# Patient Record
Sex: FEMALE | Race: WHITE | NOT HISPANIC OR LATINO
[De-identification: names, ages, dates, MRNs, and addresses within clinical notes are randomized per-mention and may not be internally consistent; named-entity substitution may affect disease eponyms.]

---

## 2017-09-12 ENCOUNTER — RESULT REVIEW (OUTPATIENT)
Age: 47
End: 2017-09-12

## 2018-03-13 ENCOUNTER — TRANSCRIPTION ENCOUNTER (OUTPATIENT)
Age: 48
End: 2018-03-13

## 2018-04-24 ENCOUNTER — EMERGENCY (EMERGENCY)
Facility: HOSPITAL | Age: 48
LOS: 1 days | Discharge: HOME | End: 2018-04-24
Admitting: EMERGENCY MEDICINE
Payer: COMMERCIAL

## 2018-04-24 VITALS
DIASTOLIC BLOOD PRESSURE: 79 MMHG | TEMPERATURE: 98 F | OXYGEN SATURATION: 99 % | RESPIRATION RATE: 18 BRPM | HEART RATE: 78 BPM | SYSTOLIC BLOOD PRESSURE: 131 MMHG

## 2018-04-24 VITALS — SYSTOLIC BLOOD PRESSURE: 108 MMHG | DIASTOLIC BLOOD PRESSURE: 80 MMHG

## 2018-04-24 PROBLEM — Z00.00 ENCOUNTER FOR PREVENTIVE HEALTH EXAMINATION: Status: ACTIVE | Noted: 2018-04-24

## 2018-04-24 LAB
ALBUMIN SERPL ELPH-MCNC: 4.3 G/DL — SIGNIFICANT CHANGE UP (ref 3.3–5)
ALP SERPL-CCNC: 59 U/L — SIGNIFICANT CHANGE UP (ref 40–120)
ALT FLD-CCNC: 18 U/L — SIGNIFICANT CHANGE UP (ref 4–33)
AST SERPL-CCNC: 17 U/L — SIGNIFICANT CHANGE UP (ref 4–32)
BASOPHILS # BLD AUTO: 0.02 K/UL — SIGNIFICANT CHANGE UP (ref 0–0.2)
BASOPHILS NFR BLD AUTO: 0.2 % — SIGNIFICANT CHANGE UP (ref 0–2)
BILIRUB SERPL-MCNC: 0.4 MG/DL — SIGNIFICANT CHANGE UP (ref 0.2–1.2)
BUN SERPL-MCNC: 12 MG/DL — SIGNIFICANT CHANGE UP (ref 7–23)
CALCIUM SERPL-MCNC: 9.4 MG/DL — SIGNIFICANT CHANGE UP (ref 8.4–10.5)
CHLORIDE SERPL-SCNC: 100 MMOL/L — SIGNIFICANT CHANGE UP (ref 98–107)
CO2 SERPL-SCNC: 28 MMOL/L — SIGNIFICANT CHANGE UP (ref 22–31)
CREAT SERPL-MCNC: 0.67 MG/DL — SIGNIFICANT CHANGE UP (ref 0.5–1.3)
EOSINOPHIL # BLD AUTO: 0.01 K/UL — SIGNIFICANT CHANGE UP (ref 0–0.5)
EOSINOPHIL NFR BLD AUTO: 0.1 % — SIGNIFICANT CHANGE UP (ref 0–6)
GLUCOSE SERPL-MCNC: 104 MG/DL — HIGH (ref 70–99)
HCT VFR BLD CALC: 40.5 % — SIGNIFICANT CHANGE UP (ref 34.5–45)
HGB BLD-MCNC: 13.4 G/DL — SIGNIFICANT CHANGE UP (ref 11.5–15.5)
IMM GRANULOCYTES # BLD AUTO: 0.03 # — SIGNIFICANT CHANGE UP
IMM GRANULOCYTES NFR BLD AUTO: 0.3 % — SIGNIFICANT CHANGE UP (ref 0–1.5)
LYMPHOCYTES # BLD AUTO: 0.78 K/UL — LOW (ref 1–3.3)
LYMPHOCYTES # BLD AUTO: 7.2 % — LOW (ref 13–44)
MCHC RBC-ENTMCNC: 29.6 PG — SIGNIFICANT CHANGE UP (ref 27–34)
MCHC RBC-ENTMCNC: 33.1 % — SIGNIFICANT CHANGE UP (ref 32–36)
MCV RBC AUTO: 89.4 FL — SIGNIFICANT CHANGE UP (ref 80–100)
MONOCYTES # BLD AUTO: 0.33 K/UL — SIGNIFICANT CHANGE UP (ref 0–0.9)
MONOCYTES NFR BLD AUTO: 3.1 % — SIGNIFICANT CHANGE UP (ref 2–14)
NEUTROPHILS # BLD AUTO: 9.61 K/UL — HIGH (ref 1.8–7.4)
NEUTROPHILS NFR BLD AUTO: 89.1 % — HIGH (ref 43–77)
NRBC # FLD: 0 — SIGNIFICANT CHANGE UP
PLATELET # BLD AUTO: 264 K/UL — SIGNIFICANT CHANGE UP (ref 150–400)
PMV BLD: 9.2 FL — SIGNIFICANT CHANGE UP (ref 7–13)
POTASSIUM SERPL-MCNC: 4.1 MMOL/L — SIGNIFICANT CHANGE UP (ref 3.5–5.3)
POTASSIUM SERPL-SCNC: 4.1 MMOL/L — SIGNIFICANT CHANGE UP (ref 3.5–5.3)
PROT SERPL-MCNC: 7.3 G/DL — SIGNIFICANT CHANGE UP (ref 6–8.3)
RBC # BLD: 4.53 M/UL — SIGNIFICANT CHANGE UP (ref 3.8–5.2)
RBC # FLD: 13.3 % — SIGNIFICANT CHANGE UP (ref 10.3–14.5)
SODIUM SERPL-SCNC: 140 MMOL/L — SIGNIFICANT CHANGE UP (ref 135–145)
WBC # BLD: 10.78 K/UL — HIGH (ref 3.8–10.5)
WBC # FLD AUTO: 10.78 K/UL — HIGH (ref 3.8–10.5)

## 2018-04-24 PROCEDURE — 99284 EMERGENCY DEPT VISIT MOD MDM: CPT

## 2018-04-24 RX ORDER — SODIUM CHLORIDE 9 MG/ML
1000 INJECTION INTRAMUSCULAR; INTRAVENOUS; SUBCUTANEOUS ONCE
Qty: 0 | Refills: 0 | Status: COMPLETED | OUTPATIENT
Start: 2018-04-24 | End: 2018-04-24

## 2018-04-24 RX ORDER — METOCLOPRAMIDE HCL 10 MG
10 TABLET ORAL ONCE
Qty: 0 | Refills: 0 | Status: COMPLETED | OUTPATIENT
Start: 2018-04-24 | End: 2018-04-24

## 2018-04-24 RX ORDER — ONDANSETRON 8 MG/1
4 TABLET, FILM COATED ORAL ONCE
Qty: 0 | Refills: 0 | Status: COMPLETED | OUTPATIENT
Start: 2018-04-24 | End: 2018-04-24

## 2018-04-24 RX ORDER — KETOROLAC TROMETHAMINE 30 MG/ML
15 SYRINGE (ML) INJECTION ONCE
Qty: 0 | Refills: 0 | Status: DISCONTINUED | OUTPATIENT
Start: 2018-04-24 | End: 2018-04-24

## 2018-04-24 RX ADMIN — Medication 10 MILLIGRAM(S): at 15:26

## 2018-04-24 RX ADMIN — ONDANSETRON 4 MILLIGRAM(S): 8 TABLET, FILM COATED ORAL at 15:27

## 2018-04-24 RX ADMIN — SODIUM CHLORIDE 1000 MILLILITER(S): 9 INJECTION INTRAMUSCULAR; INTRAVENOUS; SUBCUTANEOUS at 15:21

## 2018-04-24 RX ADMIN — Medication 15 MILLIGRAM(S): at 15:26

## 2018-04-24 NOTE — ED ADULT NURSE NOTE - OBJECTIVE STATEMENT
Pt received to intake 10.  seen and eval'd by md.  sl placed.  labs sent.  meds given as ordered.  resting in nad.

## 2018-04-24 NOTE — ED PROVIDER NOTE - OBJECTIVE STATEMENT
47 yo F hx of migraines (typically takes Excedrin) here for headache x 2 days. pt reports over the past 2 days she has had a frontal headache, pressure, associated with nausea and dry heaving. Feels similar to all previous migraines. 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 Denies head trauma. Denies fever chills neck pain/stiffness abdominal pain diarrhea cp sob weakness dizziness

## 2018-04-24 NOTE — ED PROVIDER NOTE - MEDICAL DECISION MAKING DETAILS
49 yo F here for migraine, similar to previous migraines. Otherwise well. neuro intact, consistent with previous migraines, no new sxs. PLAN: fluids, meds, labs,

## 2018-04-24 NOTE — ED PROVIDER NOTE - PROGRESS NOTE DETAILS
JONATHAN Burger: pt doing well, no complaints, HA resolved, labs reviewed. Will dc with otc meds and pmd follow up.

## 2018-04-24 NOTE — ED PROVIDER NOTE - CARE PLAN
Principal Discharge DX:	Migraine  Assessment and plan of treatment:	Rest, drink plenty of fluids.  Advance activity as tolerated.  Continue all previously prescribed medications as directed. You can use motrin 600mg every 6-8 hours for pain or fever, and/or Tylenol 650 mg every 4 hours for pain/fever. Follow up with your primary care physician in 48-72 hours- bring copies of your results.  Return to the emergency department for chest pain, shortness of breath, dizziness, or worsening, concerning, or persistent symptoms.

## 2021-09-20 ENCOUNTER — INPATIENT (INPATIENT)
Facility: HOSPITAL | Age: 51
LOS: 0 days | Discharge: LEFT BEFORE DISCHARGE | End: 2021-09-21
Attending: INTERNAL MEDICINE | Admitting: INTERNAL MEDICINE
Payer: COMMERCIAL

## 2021-09-20 VITALS
HEIGHT: 66 IN | RESPIRATION RATE: 16 BRPM | OXYGEN SATURATION: 100 % | HEART RATE: 123 BPM | TEMPERATURE: 98 F | DIASTOLIC BLOOD PRESSURE: 110 MMHG | SYSTOLIC BLOOD PRESSURE: 149 MMHG

## 2021-09-20 DIAGNOSIS — G93.40 ENCEPHALOPATHY, UNSPECIFIED: ICD-10-CM

## 2021-09-20 DIAGNOSIS — R41.82 ALTERED MENTAL STATUS, UNSPECIFIED: ICD-10-CM

## 2021-09-20 DIAGNOSIS — Z29.9 ENCOUNTER FOR PROPHYLACTIC MEASURES, UNSPECIFIED: ICD-10-CM

## 2021-09-20 DIAGNOSIS — G43.909 MIGRAINE, UNSPECIFIED, NOT INTRACTABLE, WITHOUT STATUS MIGRAINOSUS: ICD-10-CM

## 2021-09-20 LAB
ALBUMIN SERPL ELPH-MCNC: 4.3 G/DL — SIGNIFICANT CHANGE UP (ref 3.3–5)
ALP SERPL-CCNC: 55 U/L — SIGNIFICANT CHANGE UP (ref 40–120)
ALT FLD-CCNC: 11 U/L — SIGNIFICANT CHANGE UP (ref 4–33)
AMMONIA BLD-MCNC: 25 UMOL/L — SIGNIFICANT CHANGE UP (ref 11–55)
ANION GAP SERPL CALC-SCNC: 9 MMOL/L — SIGNIFICANT CHANGE UP (ref 7–14)
APPEARANCE UR: ABNORMAL
AST SERPL-CCNC: 14 U/L — SIGNIFICANT CHANGE UP (ref 4–32)
B PERT DNA SPEC QL NAA+PROBE: SIGNIFICANT CHANGE UP
B PERT+PARAPERT DNA PNL SPEC NAA+PROBE: SIGNIFICANT CHANGE UP
BACTERIA # UR AUTO: ABNORMAL
BASOPHILS # BLD AUTO: 0.02 K/UL — SIGNIFICANT CHANGE UP (ref 0–0.2)
BASOPHILS NFR BLD AUTO: 0.2 % — SIGNIFICANT CHANGE UP (ref 0–2)
BILIRUB SERPL-MCNC: 0.4 MG/DL — SIGNIFICANT CHANGE UP (ref 0.2–1.2)
BILIRUB UR-MCNC: NEGATIVE — SIGNIFICANT CHANGE UP
BLOOD GAS VENOUS COMPREHENSIVE RESULT: SIGNIFICANT CHANGE UP
BORDETELLA PARAPERTUSSIS (RAPRVP): SIGNIFICANT CHANGE UP
BUN SERPL-MCNC: 8 MG/DL — SIGNIFICANT CHANGE UP (ref 7–23)
C PNEUM DNA SPEC QL NAA+PROBE: SIGNIFICANT CHANGE UP
CALCIUM SERPL-MCNC: 9.5 MG/DL — SIGNIFICANT CHANGE UP (ref 8.4–10.5)
CHLORIDE SERPL-SCNC: 104 MMOL/L — SIGNIFICANT CHANGE UP (ref 98–107)
CK SERPL-CCNC: 169 U/L — SIGNIFICANT CHANGE UP (ref 25–170)
CK SERPL-CCNC: 196 U/L — HIGH (ref 25–170)
CO2 SERPL-SCNC: 25 MMOL/L — SIGNIFICANT CHANGE UP (ref 22–31)
COLOR SPEC: YELLOW — SIGNIFICANT CHANGE UP
COMMENT - URINE: SIGNIFICANT CHANGE UP
CREAT SERPL-MCNC: 0.76 MG/DL — SIGNIFICANT CHANGE UP (ref 0.5–1.3)
DIFF PNL FLD: ABNORMAL
EOSINOPHIL # BLD AUTO: 0.01 K/UL — SIGNIFICANT CHANGE UP (ref 0–0.5)
EOSINOPHIL NFR BLD AUTO: 0.1 % — SIGNIFICANT CHANGE UP (ref 0–6)
EPI CELLS # UR: 15 /HPF — HIGH (ref 0–5)
FLUAV SUBTYP SPEC NAA+PROBE: SIGNIFICANT CHANGE UP
FLUBV RNA SPEC QL NAA+PROBE: SIGNIFICANT CHANGE UP
FOLATE SERPL-MCNC: 13.8 NG/ML — SIGNIFICANT CHANGE UP (ref 3.1–17.5)
GLUCOSE BLDC GLUCOMTR-MCNC: 93 MG/DL — SIGNIFICANT CHANGE UP (ref 70–99)
GLUCOSE SERPL-MCNC: 126 MG/DL — HIGH (ref 70–99)
GLUCOSE UR QL: NEGATIVE — SIGNIFICANT CHANGE UP
HADV DNA SPEC QL NAA+PROBE: SIGNIFICANT CHANGE UP
HCG SERPL-ACNC: <5 MIU/ML — SIGNIFICANT CHANGE UP
HCOV 229E RNA SPEC QL NAA+PROBE: SIGNIFICANT CHANGE UP
HCOV HKU1 RNA SPEC QL NAA+PROBE: SIGNIFICANT CHANGE UP
HCOV NL63 RNA SPEC QL NAA+PROBE: SIGNIFICANT CHANGE UP
HCOV OC43 RNA SPEC QL NAA+PROBE: SIGNIFICANT CHANGE UP
HCT VFR BLD CALC: 40.8 % — SIGNIFICANT CHANGE UP (ref 34.5–45)
HGB BLD-MCNC: 13.6 G/DL — SIGNIFICANT CHANGE UP (ref 11.5–15.5)
HMPV RNA SPEC QL NAA+PROBE: SIGNIFICANT CHANGE UP
HPIV1 RNA SPEC QL NAA+PROBE: SIGNIFICANT CHANGE UP
HPIV2 RNA SPEC QL NAA+PROBE: SIGNIFICANT CHANGE UP
HPIV3 RNA SPEC QL NAA+PROBE: SIGNIFICANT CHANGE UP
HPIV4 RNA SPEC QL NAA+PROBE: SIGNIFICANT CHANGE UP
HYALINE CASTS # UR AUTO: 3 /LPF — SIGNIFICANT CHANGE UP (ref 0–7)
IANC: 7.16 K/UL — SIGNIFICANT CHANGE UP (ref 1.5–8.5)
IMM GRANULOCYTES NFR BLD AUTO: 0.3 % — SIGNIFICANT CHANGE UP (ref 0–1.5)
KETONES UR-MCNC: ABNORMAL
LACTATE SERPL-SCNC: 0.7 MMOL/L — SIGNIFICANT CHANGE UP (ref 0.5–2)
LEUKOCYTE ESTERASE UR-ACNC: NEGATIVE — SIGNIFICANT CHANGE UP
LYMPHOCYTES # BLD AUTO: 0.92 K/UL — LOW (ref 1–3.3)
LYMPHOCYTES # BLD AUTO: 10.7 % — LOW (ref 13–44)
M PNEUMO DNA SPEC QL NAA+PROBE: SIGNIFICANT CHANGE UP
MCHC RBC-ENTMCNC: 28.9 PG — SIGNIFICANT CHANGE UP (ref 27–34)
MCHC RBC-ENTMCNC: 33.3 GM/DL — SIGNIFICANT CHANGE UP (ref 32–36)
MCV RBC AUTO: 86.8 FL — SIGNIFICANT CHANGE UP (ref 80–100)
MONOCYTES # BLD AUTO: 0.49 K/UL — SIGNIFICANT CHANGE UP (ref 0–0.9)
MONOCYTES NFR BLD AUTO: 5.7 % — SIGNIFICANT CHANGE UP (ref 2–14)
NEUTROPHILS # BLD AUTO: 7.16 K/UL — SIGNIFICANT CHANGE UP (ref 1.8–7.4)
NEUTROPHILS NFR BLD AUTO: 83 % — HIGH (ref 43–77)
NITRITE UR-MCNC: NEGATIVE — SIGNIFICANT CHANGE UP
NRBC # BLD: 0 /100 WBCS — SIGNIFICANT CHANGE UP
NRBC # FLD: 0 K/UL — SIGNIFICANT CHANGE UP
PCP SPEC-MCNC: SIGNIFICANT CHANGE UP
PH UR: 5.5 — SIGNIFICANT CHANGE UP (ref 5–8)
PLATELET # BLD AUTO: 362 K/UL — SIGNIFICANT CHANGE UP (ref 150–400)
POTASSIUM SERPL-MCNC: 3.8 MMOL/L — SIGNIFICANT CHANGE UP (ref 3.5–5.3)
POTASSIUM SERPL-SCNC: 3.8 MMOL/L — SIGNIFICANT CHANGE UP (ref 3.5–5.3)
PROT SERPL-MCNC: 7.1 G/DL — SIGNIFICANT CHANGE UP (ref 6–8.3)
PROT UR-MCNC: ABNORMAL
RAPID RVP RESULT: SIGNIFICANT CHANGE UP
RBC # BLD: 4.7 M/UL — SIGNIFICANT CHANGE UP (ref 3.8–5.2)
RBC # FLD: 13.7 % — SIGNIFICANT CHANGE UP (ref 10.3–14.5)
RBC CASTS # UR COMP ASSIST: 3 /HPF — SIGNIFICANT CHANGE UP (ref 0–4)
RSV RNA SPEC QL NAA+PROBE: SIGNIFICANT CHANGE UP
RV+EV RNA SPEC QL NAA+PROBE: SIGNIFICANT CHANGE UP
SARS-COV-2 RNA SPEC QL NAA+PROBE: SIGNIFICANT CHANGE UP
SODIUM SERPL-SCNC: 138 MMOL/L — SIGNIFICANT CHANGE UP (ref 135–145)
SP GR SPEC: 1.03 — SIGNIFICANT CHANGE UP (ref 1–1.05)
TOXICOLOGY SCREEN, DRUGS OF ABUSE, SERUM RESULT: SIGNIFICANT CHANGE UP
TSH SERPL-MCNC: 2.07 UIU/ML — SIGNIFICANT CHANGE UP (ref 0.27–4.2)
UROBILINOGEN FLD QL: SIGNIFICANT CHANGE UP
VIT B12 SERPL-MCNC: 581 PG/ML — SIGNIFICANT CHANGE UP (ref 200–900)
WBC # BLD: 8.63 K/UL — SIGNIFICANT CHANGE UP (ref 3.8–10.5)
WBC # FLD AUTO: 8.63 K/UL — SIGNIFICANT CHANGE UP (ref 3.8–10.5)
WBC UR QL: 2 /HPF — SIGNIFICANT CHANGE UP (ref 0–5)

## 2021-09-20 PROCEDURE — 99285 EMERGENCY DEPT VISIT HI MDM: CPT

## 2021-09-20 PROCEDURE — 99223 1ST HOSP IP/OBS HIGH 75: CPT

## 2021-09-20 PROCEDURE — 70498 CT ANGIOGRAPHY NECK: CPT | Mod: 26

## 2021-09-20 PROCEDURE — 70496 CT ANGIOGRAPHY HEAD: CPT | Mod: 26

## 2021-09-20 RX ORDER — LANOLIN ALCOHOL/MO/W.PET/CERES
3 CREAM (GRAM) TOPICAL AT BEDTIME
Refills: 0 | Status: DISCONTINUED | OUTPATIENT
Start: 2021-09-20 | End: 2021-09-21

## 2021-09-20 RX ORDER — ONDANSETRON 8 MG/1
4 TABLET, FILM COATED ORAL EVERY 8 HOURS
Refills: 0 | Status: DISCONTINUED | OUTPATIENT
Start: 2021-09-20 | End: 2021-09-21

## 2021-09-20 RX ORDER — SODIUM CHLORIDE 9 MG/ML
1000 INJECTION, SOLUTION INTRAVENOUS ONCE
Refills: 0 | Status: COMPLETED | OUTPATIENT
Start: 2021-09-20 | End: 2021-09-20

## 2021-09-20 RX ORDER — ACETAMINOPHEN 500 MG
650 TABLET ORAL EVERY 6 HOURS
Refills: 0 | Status: DISCONTINUED | OUTPATIENT
Start: 2021-09-20 | End: 2021-09-21

## 2021-09-20 RX ADMIN — SODIUM CHLORIDE 1000 MILLILITER(S): 9 INJECTION, SOLUTION INTRAVENOUS at 07:56

## 2021-09-20 RX ADMIN — Medication 650 MILLIGRAM(S): at 20:27

## 2021-09-20 RX ADMIN — Medication 650 MILLIGRAM(S): at 20:42

## 2021-09-20 NOTE — H&P ADULT - PROBLEM SELECTOR PLAN 1
Etiology is not clear at this time, r/o seizure, TIA, encephaliti, r/o psychiatric etiology  -Neuro consult  -Consider EEG and MRI  -Fall precaution   -CO for safety

## 2021-09-20 NOTE — ED ADULT NURSE NOTE - CHIEF COMPLAINT QUOTE
As per friend, on Saturday pt was "Acting different and appeared confused." Friend states she has been having "shakes" since then but yesterday she had an episode where her eyes rolled back and she was drooling. Pt states she feels different but cannot explain. Denies chest pain, sob, HA, dizziness, vision changes, numbness/tingling, PMHx. Pt aaox2 at present.    Friend Martha Zaira: [phone]

## 2021-09-20 NOTE — H&P ADULT - NSHPPHYSICALEXAM_GEN_ALL_CORE
Vital Signs Last 24 Hrs  T(C): 36.9 (20 Sep 2021 11:50), Max: 36.9 (20 Sep 2021 11:50)  T(F): 98.5 (20 Sep 2021 11:50), Max: 98.5 (20 Sep 2021 11:50)  HR: 88 (20 Sep 2021 11:50) (88 - 123)  BP: 143/92 (20 Sep 2021 11:50) (143/92 - 149/110)  BP(mean): --  RR: 17 (20 Sep 2021 11:50) (16 - 17)  SpO2: 98% (20 Sep 2021 11:50) (98% - 100%)

## 2021-09-20 NOTE — CONSULT NOTE ADULT - ASSESSMENT
51 y.o. female with a history of migraine headaches, prior admission 06/13/2011 with c/o fever/HA/acute change in mental status with negative general CSF studies and resolution of symptoms in one day, presenting with acute change in mental status and confusion since Sunday 9/19 at 0400. Patient reported to wake up at that time to go to the bathroom and noted to have difficulty expressing herself, oriented to person only and responding to questions by only stating her name. Vitals on presentation: 149/110, 123, 16, 98.2, 100% RA. On exam patient is oriented to self, some perseveration noted when asked location and date ("168-08"), speech fluent with word finding difficulty/hesitancy noted, hyperreflexia. Labs reviewed and wnl, utox negative, UA not suggestive of infection. Imaging with CTH/CTA head and neck unremarkable.     Impression:       Recommendations:   Labs:  []  [] Infectious workup  [] NH3, lactate, CK  [] Folate, B12  [] TSH    Imaging/Studies  [] Consider MRI brain   [] Consider Routine EEG  [] Consider LP if symptoms persist or worsen    Other:  [] Telemonitoring  [] Neurochecks and vital signs per protocol      ***INCOMPLETE*** 51 y.o. female with a history of migraine headaches, prior admission 06/13/2011 with c/o fever/HA/acute change in mental status with negative general CSF studies and resolution of symptoms in one day, presenting with acute change in mental status and confusion since Sunday 9/19***  Vitals on presentation: 149/110, 123, 16, 98.2, 100% RA. On exam patient is oriented to self, some perseveration noted when asked location and date ("168-08"), speech fluent with word finding difficulty/hesitancy noted, hyperreflexia. Labs reviewed and wnl, utox negative, UA not suggestive of infection. Imaging with CTH/CTA head and neck unremarkable.     Impression:       Recommendations:   Labs:  []  [] Infectious workup  [] NH3, lactate, CK  [] Folate, B12  [] TSH    Imaging/Studies  [] Routine EEG  [] Consider MRI brain   [] Consider LP if symptoms persist or worsen    Other:  [] Telemonitoring  [] Neurochecks and vital signs per protocol  [] Seizure precautions; Fall precautions      ***INCOMPLETE*** 51 y.o. female with a history of migraine headaches, prior admission 06/13/2011 with c/o fever/HA/acute change in mental status with negative general CSF studies and resolution of symptoms in one day, episode of shaking and change in mentation either late last year or earlier this year (12/2020 -01/2021) after which she was reportedly started on Hydroxyzine by a GP, presenting with acute change in mental status and confusion since Sunday 9/19, at which time patient is reported to have had an episode of whole body shaking with foaming at the mouth and eyes rolled back, lasting less than a minute after which the patient fell asleep. Patient subsequently reported to have a change in mentation, as per friend she initially had no difficulty speaking, but appeared to have difficulty with comprehension. Vitals on presentation: 149/110, 123, 16, 98.2, 100% RA. On exam patient is oriented to self, some perseveration noted when asked location and date ("168-08"), speech fluent with word finding difficulty/hesitancy noted, hyperreflexia. Labs reviewed and wnl, utox negative, UA not suggestive of infection. Imaging with CTH/CTA head and neck unremarkable.     Impression: Acute change in mental status, word finding difficulty/hesitancy possibly due to toxic/metabolic etiology vs seizure vs due to infectious etiology vs possible underlying autoimmune/inflammatory pathology, thought low suspicion at this time.      Recommendations:   Labs:  [] Infectious workup  [] NH3, lactate, CK  [] Folate, B12  [] TSH    Imaging/Studies  [] Routine EEG  [] If symptoms persist or worsen, can consider MRI brain and LP    Other:  [] Telemonitoring  [] Neurochecks and vital signs per protocol  [] Seizure precautions; Fall precautions      Case to be seen and discussed with Neurology attending Dr. Miramontes. Recommendations to be finalized upon attestation. 51 y.o. female with a history of migraine headaches, prior admission 06/13/2011 with c/o fever/HA/acute change in mental status with negative general CSF studies and resolution of symptoms in one day, episode of shaking and change in mentation either late last year or earlier this year (12/2020 -01/2021) after which she was reportedly started on Hydroxyzine by a GP, presenting with acute change in mental status and confusion since Sunday 9/19, at which time patient is reported to have had an episode of whole body shaking with foaming at the mouth and eyes rolled back, lasting less than a minute after which the patient fell asleep. Patient subsequently reported to have a change in mentation, as per friend she initially had no difficulty speaking, but appeared to have difficulty with comprehension. Vitals on presentation: 149/110, 123, 16, 98.2, 100% RA. On exam patient is oriented to self, some perseveration noted when asked location and date ("168-08"), speech fluent with word finding difficulty/hesitancy noted, hyperreflexia. Labs reviewed and wnl, utox negative, UA not suggestive of infection. Imaging with CTH/CTA head and neck unremarkable.     Impression: Acute change in mental status, word finding difficulty/hesitancy possibly due to toxic/metabolic etiology vs seizure vs due to infectious etiology vs possible underlying autoimmune/inflammatory pathology, thought low suspicion at this time.      Recommendations:   Labs:  [] Infectious workup  [] NH3, lactate, CK  [] Folate, B12  [] TSH    Imaging/Studies  [] Routine EEG and vEEG  [] MRI brain w/wo contrast  [] If symptoms persist or worsen, can consider LP    Other:  [] Telemonitoring  [] Neurochecks and vital signs per protocol  [] Seizure precautions; Fall precautions      Case discussed with Neurology attending Dr. Miramontes. Recommendations to be finalized upon attestation.

## 2021-09-20 NOTE — H&P ADULT - NEUROLOGICAL DETAILS
responds to pain/responds to verbal commands/sensation intact/deep reflexes intact/cranial nerves intact/normal strength/disoriented

## 2021-09-20 NOTE — ED PROVIDER NOTE - NS ED ROS FT
3 = unable to understand or speak (not related to language barrier) Pt denies complaints but is A+O X 1

## 2021-09-20 NOTE — H&P ADULT - NSHPSOCIALHISTORY_GEN_ALL_CORE
Pt denies any alcohol, illicit drug or tobacco abuse  She works as a  at [employer]   She stated that she is  to Martha.

## 2021-09-20 NOTE — CONSULT NOTE ADULT - ATTENDING COMMENTS
52 y/o woman no significant PMH p/w episode of AMS, shaking following by unresponsiveness. Pt has hx of two prior episodes that were similar back in December and March of this year. Pt is feeling much better today. 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 Pt's mood is good, normal affect, but very talkative, and saying that she recognizes multiple people in the room. She is fully oriented, answering questions appropriately, Follows commands, language normal, strength 5/5 throughout.  Possible seizure event with full return to baseline.   - routine EEG  - Can get rest of workup as outpatient. She can f/u with me [phone] [address] [facility] for MRI and continuous EEG  - Would get Psych for anxiety and odd affect, possible otilia. 52 y/o woman hx of migraines p/w episode of AMS, shaking following by unresponsiveness. Pt has hx of two prior episodes that were similar back in December and March of this year. Pt is feeling much better today. 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 Pt's mood is good, normal affect, but very talkative, and saying that she recognizes multiple people in the room. She is fully oriented, answering questions appropriately, Follows commands, language normal, strength 5/5 throughout.  Possible seizure event with full return to baseline.   - routine EEG  - Can get rest of workup as outpatient. She can f/u with me [phone] [address] [facility] for MRI and continuous EEG  - Would get Psych for anxiety and odd affect, possible otilia.

## 2021-09-20 NOTE — ED PROVIDER NOTE - OBJECTIVE STATEMENT
52 Y/O F 50 Y/O F PMH migraines presents with altered mental status. Pt lives with her friend Martha Meneses . States that at 4AM on Sunday mnorning 9/19/21 she became acutely confused after waking up to go to the bathroom. States she has not been able to express herself and her behavior at home has been strange. Pt will attempt to respond to questioning but is only able to state her name. Pt was previously admitted for similar presentation years ago but the etiology was not discovered. Pt denies drug use. Pt is employed as a .

## 2021-09-20 NOTE — ED ADULT NURSE NOTE - OBJECTIVE STATEMENT
pt received alert and oriented x2. pt has weird affect. pt does not know why she is in the hospital. respirations equal and unlabored. abdomen soft and nontender. 20g placed right arm . labs drawn and sent. Call bell in reach, warm blanket provided, bed in lowest position, side rails up x2,safety maintained. will continue to monitor.

## 2021-09-20 NOTE — ED PROVIDER NOTE - NSICDXPASTMEDICALHX_GEN_ALL_CORE_FT
PAST MEDICAL HISTORY:  No pertinent past medical history      PAST MEDICAL HISTORY:  Migraine headache

## 2021-09-20 NOTE — ED PROVIDER NOTE - CLINICAL SUMMARY MEDICAL DECISION MAKING FREE TEXT BOX
52 Y/O F PMH migraines presents with altered mental status. Pt lives with her friend Martha Meneses . States that at 4AM on Sunday morning 9/19/21 she became acutely confused after waking up to go to the bathroom. No focal deficits noted on neuro exam though pt is only oriented to self. Plan is labs to eval for drug use, anemia, electrolyte disturbance, B12 deficiency, Lyme Disease or other reversible etiology. Will check a CTA to eval for stroke. Planning admission for acute encephalopathy.

## 2021-09-20 NOTE — CONSULT NOTE ADULT - SUBJECTIVE AND OBJECTIVE BOX
INCOMPLETE    HPI:  51 y.o. female with a history of migraine headaches presenting with acute change in mental status and confusion since  at 0400. Patient reported to wake up at that time to go to the bathroom and noted to have difficulty expressing herself, oriented to person only and responding ot questions by only stating her name. Patient is reported to have presented similarly once before on 2011, at which time her symptoms resolved and no etiology was determined. At that time she had associated headache and fever and there was concern for possible viral meningitis/encephalitis. However CSF studies not c/w infection and patient improved over the course of the day.  Patient does not report any confusion or memory difficulties, headache, dizziness, blurry or double vision, nausea, vomiting, numbness or tingling. She reports no recent illness or injury.   ***      REVIEW OF SYSTEMS:  Constitutional: No fever, chills  Eyes: No visual disturbances  ENT:  No difficulty hearing, tinnitus, vertigo; No sinus or throat pain  Neck: No pain or stiffness  Respiratory: No dyspnea  Cardiovascular: No chest pain  Gastrointestinal: No nausea, vomiting.  Neurological: No headaches, lightheadedness, numbness  A 10-system ROS was performed and is negative except as noted above and/or in the HPI.      PAST MEDICAL & SURGICAL HISTORY:  Migraine headache    C Section       FAMILY HISTORY:      SOCIAL HISTORY:       MEDICATIONS    Home Medications:    MEDICATIONS  (STANDING):    MEDICATIONS  (PRN):      ALLERGIES    epinephrine (Other)  Sudafed (Other)      VITALS & EXAMINATION    Height (cm): 167.6 (09-20 @ 06:54)    Vital Signs Last 24 Hrs  T(C): 36.9 (20 Sep 2021 11:50), Max: 36.9 (20 Sep 2021 11:50)  T(F): 98.5 (20 Sep 2021 11:50), Max: 98.5 (20 Sep 2021 11:50)  HR: 88 (20 Sep 2021 11:50) (88 - 123)  BP: 143/92 (20 Sep 2021 11:50) (143/92 - 149/110)  RR: 17 (20 Sep 2021 11:50) (16 - 17)  SpO2: 98% (20 Sep 2021 11:50) (98% - 100%)    General Exam:  Constitutional: NAD, pleasant, cooperative  Head: NT/AT   Chest: No signs of resp distress, on room air  Abd: Soft, NTTP  Extremities: No edema  Skin: No rashes, bruising, or discoloration.  	    Neuro Exam:  Mental Status: Patient is alert, awake, oriented to person (able to state name); replies "" to place and time, replies "" when asked specifically for the year, replies "1950" when asked specifically for the month. Follows all commands. Recalls 0/3 words at 5 minutes. Able to get from 100 to 84 on serial 7s, but unable to do a 3rd subtraction.                        Language: Speech is clear, fluent with good repetition, however  with hesitancy/word finding difficulty; responses frequently delayed when asked questions or asked to identify objects, at times with no verbal response provided, though she appears to be trying to respond. Identifies "pen" and "watch" with ease, replies "tag" when showed ID badge, unable to identify clipboard and computer though expresses frustration/appears to recognize these objects, and is subsequently able to name "keyboard".     CNs: Pupils minimally reactive (R = 2mm, L = 2mm). Visual acuity with glasses 20/20 OD and OS. VFF. EOMI no nystagmus. V1-3 intact to LT, well developed masseter muscles b/l. No facial asymmetry b/l, full eye closure strength b/l. Hearing grossly normal (rubbing fingers) b/l. Symmetric palate elevation in midline. Gag reflex deferred. Tongue midline, normal movements.    Motor:  Muscle bulk and tone are normal. Strength is 5/5 in all four extremities both proximally and distally. Intact fine motor movements bilaterally. There is no pronator drift.	     Sensation: Intact to LT/PP b/l throughout.     Cortical: Extinction on DSS (neglect): none    Reflexes: + crossed adductors, 1-2 beats clonus b/l              Biceps(C5)       BR(C6)     Triceps(C7)               Patellar(L4)    Achilles(S1)    Plantar Resp  R	3	          3	             2		        3		    2		Mute  L	3	          3	             2		        3		    2		Mute    Coordination: No dysmetria to FTN/HTS    Gait: No postural instability. Normal stance and tandem gait.         LABS:    CBC                       13.6   8.63  )-----------( 362      ( 20 Sep 2021 08:14 )             40.8     Chem -    138  |  104  |  8   ----------------------------<  126<H>  3.8   |  25  |  0.76    Ca    9.5      20 Sep 2021 08:14    TPro  7.1  /  Alb  4.3  /  TBili  0.4  /  DBili  x   /  AST  14  /  ALT  11  /  AlkPhos  55  09-20    Coags   LFTs LIVER FUNCTIONS - ( 20 Sep 2021 08:14 )  Alb: 4.3 g/dL / Pro: 7.1 g/dL / ALK PHOS: 55 U/L / ALT: 11 U/L / AST: 14 U/L / GGT: x           Lipid Panel   A1c   Cardiac enzymes     U/A Urinalysis Basic - ( 20 Sep 2021 09:59 )    Color: Yellow / Appearance: Slightly Turbid / S.027 / pH: x  Gluc: x / Ketone: Small  / Bili: Negative / Urobili: <2 mg/dL   Blood: x / Protein: 30 mg/dL / Nitrite: Negative   Leuk Esterase: Negative / RBC: 3 /HPF / WBC 2 /HPF   Sq Epi: x / Non Sq Epi: 15 /HPF / Bacteria: Few      STUDIES & IMAGING    21    CT brain:  No hydrocephalus, acute intracranial hemorrhage, mass effect, or brain edema.  No abnormal intracranial enhancement.    CTA brain:  No vascular aneurysm, flow-limiting stenosis, or AVM.    CTA neck:  No flow-limiting stenosis or evidence for arterial dissection. INCOMPLETE    HPI:  51 y.o. female with a history of migraine headaches presenting with acute change in mental status and confusion since . Per discussion with patient's friend, she had a few drinks on Saturday night, although she rarely drinks. On  morning around 0630 she woke up with whole body twitches and took two extra strength tylenol. At approximately 715 she felt the tylenol was not doing anything for her symptoms and took 1/4 of her home hydroxyzine, followed by the remaining 3/4 prior to 0800. The patient subsequently slept for most of the day until her friend heard a scream and ran to find her shaking, foaming at the mouth with her eyes rolled back. She reports this episode last just under a minute, after which the patient immediately fell back asleep. This morning she did not appear to be back to baseline, causing them to present to the ED. Per friend the patient initially had no difficulty speaking, but appeared to have difficulty with comprehension. She reports the patient had a similar episode either late last year or earlier this year (2020 -2021) consisting of the shaking and change in mentation, but without the eye rolling or foaming at the mouth, which were first time events. She was initiated on hydroxyzine by a GP following that event. She is also reported to have presented similarly on 2011, at which time her symptoms resolved and no etiology was determined. At that time she had associated headache and fever and there was concern for possible viral meningitis/encephalitis. However CSF studies not c/w infection and patient improved over the course of the day.  Patient does not report any confusion or memory difficulties, headache, dizziness, blurry or double vision, nausea, vomiting, numbness or tingling. She reports no recent illness or injury.   ***    REVIEW OF SYSTEMS:  Constitutional: No fever, chills  Eyes: No visual disturbances  ENT:  No difficulty hearing, tinnitus, vertigo; No sinus or throat pain  Neck: No pain or stiffness  Respiratory: No dyspnea  Cardiovascular: No chest pain  Gastrointestinal: No nausea, vomiting.  Neurological: No headaches, lightheadedness, numbness  A 10-system ROS was performed and is negative except as noted above and/or in the HPI.      PAST MEDICAL & SURGICAL HISTORY:  Migraine headache    C Section       FAMILY HISTORY:      SOCIAL HISTORY:       MEDICATIONS    Home Medications:    MEDICATIONS  (STANDING):    MEDICATIONS  (PRN):      ALLERGIES    epinephrine (Other)  Sudafed (Other)      VITALS & EXAMINATION    Height (cm): 167.6 (09-20 @ 06:54)    Vital Signs Last 24 Hrs  T(C): 36.9 (20 Sep 2021 11:50), Max: 36.9 (20 Sep 2021 11:50)  T(F): 98.5 (20 Sep 2021 11:50), Max: 98.5 (20 Sep 2021 11:50)  HR: 88 (20 Sep 2021 11:50) (88 - 123)  BP: 143/92 (20 Sep 2021 11:50) (143/92 - 149/110)  RR: 17 (20 Sep 2021 11:50) (16 - 17)  SpO2: 98% (20 Sep 2021 11:50) (98% - 100%)    General Exam:  Constitutional: NAD, pleasant, cooperative  Head: NT/AT   Chest: No signs of resp distress, on room air  Abd: Soft, NTTP  Extremities: No edema  Skin: No rashes, bruising, or discoloration.  	    Neuro Exam:  Mental Status: Patient is alert, awake, oriented to person (able to state name); replies "168-08" to place and time, replies "" when asked specifically for the year, replies "1950" when asked specifically for the month. Follows all commands. Recalls 0/3 words at 5 minutes. Able to get from 100 to 84 on serial 7s, but unable to do a 3rd subtraction.                        Language: Speech is clear, fluent with good repetition, however  with hesitancy/word finding difficulty; responses frequently delayed when asked questions or asked to identify objects, at times with no verbal response provided, though she appears to be trying to respond. Identifies "pen" and "watch" with ease, replies "tag" when showed ID lida, unable to identify clipboard and computer though expresses frustration/appears to recognize these objects, and is subsequently able to name "keyboard".     CNs: Pupils minimally reactive (R = 2mm, L = 2mm). Visual acuity with glasses 20/20 OD and OS. VFF. EOMI no nystagmus. V1-3 intact to LT, well developed masseter muscles b/l. No facial asymmetry b/l, full eye closure strength b/l. Hearing grossly normal (rubbing fingers) b/l. Symmetric palate elevation in midline. Gag reflex deferred. Tongue midline, normal movements.    Motor:  Muscle bulk and tone are normal. Strength is 5/5 in all four extremities both proximally and distally. Intact fine motor movements bilaterally. There is no pronator drift.	     Sensation: Intact to LT/PP b/l throughout.     Cortical: Extinction on DSS (neglect): none    Reflexes: + crossed adductors, 1-2 beats clonus b/l              Biceps(C5)       BR(C6)     Triceps(C7)               Patellar(L4)    Achilles(S1)    Plantar Resp  R	3	          3	             2		        3		    2		Mute  L	3	          3	             2		        3		    2		Mute    Coordination: No dysmetria to FTN/HTS    Gait: No postural instability. Normal stance and tandem gait.         LABS:    CBC                       13.6   8.63  )-----------( 362      ( 20 Sep 2021 08:14 )             40.8     Chem     138  |  104  |  8   ----------------------------<  126<H>  3.8   |  25  |  0.76    Ca    9.5      20 Sep 2021 08:14    TPro  7.1  /  Alb  4.3  /  TBili  0.4  /  DBili  x   /  AST  14  /  ALT  11  /  AlkPhos  55      Coags   LFTs LIVER FUNCTIONS - ( 20 Sep 2021 08:14 )  Alb: 4.3 g/dL / Pro: 7.1 g/dL / ALK PHOS: 55 U/L / ALT: 11 U/L / AST: 14 U/L / GGT: x           Lipid Panel   A1c   Cardiac enzymes     U/A Urinalysis Basic - ( 20 Sep 2021 09:59 )    Color: Yellow / Appearance: Slightly Turbid / S.027 / pH: x  Gluc: x / Ketone: Small  / Bili: Negative / Urobili: <2 mg/dL   Blood: x / Protein: 30 mg/dL / Nitrite: Negative   Leuk Esterase: Negative / RBC: 3 /HPF / WBC 2 /HPF   Sq Epi: x / Non Sq Epi: 15 /HPF / Bacteria: Few      STUDIES & IMAGING    21    CT brain:  No hydrocephalus, acute intracranial hemorrhage, mass effect, or brain edema.  No abnormal intracranial enhancement.    CTA brain:  No vascular aneurysm, flow-limiting stenosis, or AVM.    CTA neck:  No flow-limiting stenosis or evidence for arterial dissection.   HPI:  51 y.o. female with a history of migraine headaches presenting with acute change in mental status and confusion since . Per discussion with patient's friend, she had a few drinks on Saturday night, although she rarely drinks. On  morning around 0630 she woke up with whole body twitches and took two extra strength tylenol. At approximately 715 she felt the tylenol was not doing anything for her symptoms and took 1/4 of her home hydroxyzine, followed by the remaining 3/4 prior to 0800. The patient subsequently slept for most of the day until her friend heard a scream and ran to find her shaking, foaming at the mouth with her eyes rolled back. She reports this episode last just under a minute, after which the patient immediately fell back asleep. This morning she did not appear to be back to baseline, causing them to present to the ED. Per friend the patient initially had no difficulty speaking, but appeared to have difficulty with comprehension. She reports the patient had a similar episode either late last year or earlier this year (2020 -2021) consisting of the shaking and change in mentation, but without the eye rolling or foaming at the mouth, which were first time events. She was initiated on hydroxyzine by a GP following that event. She is also reported to have presented similarly on 2011, at which time her symptoms resolved and no etiology was determined. At that time she had associated headache and fever and there was concern for possible viral meningitis/encephalitis. However CSF studies not c/w infection and patient improved over the course of the day.  Patient does not report any confusion or memory difficulties, headache, dizziness, blurry or double vision, nausea, vomiting, numbness or tingling. She reports no recent illness or injury.       REVIEW OF SYSTEMS:  Constitutional: No fever, chills  Eyes: No visual disturbances  ENT:  No difficulty hearing, tinnitus, vertigo; No sinus or throat pain  Neck: No pain or stiffness  Respiratory: No dyspnea  Cardiovascular: No chest pain  Gastrointestinal: No nausea, vomiting.  Neurological: No headaches, lightheadedness, numbness  A 10-system ROS was performed and is negative except as noted above and/or in the HPI.      PAST MEDICAL & SURGICAL HISTORY:  Migraine headache    C Section       FAMILY HISTORY:      SOCIAL HISTORY:       MEDICATIONS    Home Medications:    MEDICATIONS  (STANDING):    MEDICATIONS  (PRN):      ALLERGIES    epinephrine (Other)  Sudafed (Other)      VITALS & EXAMINATION    Height (cm): 167.6 (09-20 @ 06:54)    Vital Signs Last 24 Hrs  T(C): 36.9 (20 Sep 2021 11:50), Max: 36.9 (20 Sep 2021 11:50)  T(F): 98.5 (20 Sep 2021 11:50), Max: 98.5 (20 Sep 2021 11:50)  HR: 88 (20 Sep 2021 11:50) (88 - 123)  BP: 143/92 (20 Sep 2021 11:50) (143/92 - 149/110)  RR: 17 (20 Sep 2021 11:50) (16 - 17)  SpO2: 98% (20 Sep 2021 11:50) (98% - 100%)    General Exam:  Constitutional: NAD, pleasant, cooperative  Head: NT/AT   Chest: No signs of resp distress, on room air  Abd: Soft, NTTP  Extremities: No edema  Skin: No rashes, bruising, or discoloration.  	    Neuro Exam:  Mental Status: Patient is alert, awake, oriented to person (able to state name); replies "168-08" to place and time, replies "" when asked specifically for the year, replies "1948" when asked specifically for the month. Follows all commands. Recalls 0/3 words at 5 minutes. Able to get from 100 to 84 on serial 7s, but unable to do a 3rd subtraction.                        Language: Speech is clear, fluent with good repetition, however  with hesitancy/word finding difficulty; responses frequently delayed when asked questions or asked to identify objects, at times with no verbal response provided, though she appears to be trying to respond. Identifies "pen" and "watch" with ease, replies "tag" when showed ID lida, unable to identify clipboard and computer though expresses frustration/appears to recognize these objects, and is subsequently able to name "keyboard".     CNs: Pupils minimally reactive (R = 2mm, L = 2mm). Visual acuity with glasses 20/20 OD and OS. VFF. EOMI no nystagmus. V1-3 intact to LT, well developed masseter muscles b/l. No facial asymmetry b/l, full eye closure strength b/l. Hearing grossly normal (rubbing fingers) b/l. Symmetric palate elevation in midline. Gag reflex deferred. Tongue midline, normal movements.    Motor:  Muscle bulk and tone are normal. Minimal tremor noted with extension of b/l UE. Strength is 5/5 in all four extremities both proximally and distally. Intact fine motor movements bilaterally. There is no pronator drift.	     Sensation: Intact to LT/PP b/l throughout.     Cortical: Extinction on DSS (neglect): none    Reflexes: + crossed adductors, 1-2 beats clonus b/l              Biceps(C5)       BR(C6)     Triceps(C7)               Patellar(L4)    Achilles(S1)    Plantar Resp  R	3	          3	             2		        3		    2		Mute  L	3	          3	             2		        3		    2		Mute    Coordination: No dysmetria to FTN/HTS    Gait: No postural instability. Normal stance and tandem gait.         LABS:    CBC                       13.6   8.63  )-----------( 362      ( 20 Sep 2021 08:14 )             40.8     Chem     138  |  104  |  8   ----------------------------<  126<H>  3.8   |  25  |  0.76    Ca    9.5      20 Sep 2021 08:14    TPro  7.1  /  Alb  4.3  /  TBili  0.4  /  DBili  x   /  AST  14  /  ALT  11  /  AlkPhos  55      Coags   LFTs LIVER FUNCTIONS - ( 20 Sep 2021 08:14 )  Alb: 4.3 g/dL / Pro: 7.1 g/dL / ALK PHOS: 55 U/L / ALT: 11 U/L / AST: 14 U/L / GGT: x           Lipid Panel   A1c   Cardiac enzymes     U/A Urinalysis Basic - ( 20 Sep 2021 09:59 )    Color: Yellow / Appearance: Slightly Turbid / S.027 / pH: x  Gluc: x / Ketone: Small  / Bili: Negative / Urobili: <2 mg/dL   Blood: x / Protein: 30 mg/dL / Nitrite: Negative   Leuk Esterase: Negative / RBC: 3 /HPF / WBC 2 /HPF   Sq Epi: x / Non Sq Epi: 15 /HPF / Bacteria: Few      STUDIES & IMAGING    21    CT brain:  No hydrocephalus, acute intracranial hemorrhage, mass effect, or brain edema.  No abnormal intracranial enhancement.    CTA brain:  No vascular aneurysm, flow-limiting stenosis, or AVM.    CTA neck:  No flow-limiting stenosis or evidence for arterial dissection.   HPI:  51 y.o. female with a history of migraine headaches presenting with acute change in mental status and confusion since . Per discussion with patient's friend, she had a few drinks on Saturday night, although she rarely drinks. On  morning around 0630 she woke up with whole body twitches and took two extra strength tylenol. At approximately 715 she felt the tylenol was not doing anything for her symptoms and took 1/4 of her home hydroxyzine, followed by the remaining 3/4 prior to 0800. The patient subsequently slept for most of the day until her friend heard a scream and ran to find her shaking, foaming at the mouth with her eyes rolled back. She reports this episode last just under a minute, after which the patient immediately fell back asleep. This morning she did not appear to be back to baseline, causing them to present to the ED. Per friend the patient initially had no difficulty speaking, but appeared to have difficulty with comprehension. She reports the patient had a similar episode either late last year or earlier this year (2020 -2021) consisting of the shaking and change in mentation, but without the eye rolling or foaming at the mouth, which were first time events. She was initiated on hydroxyzine by a GP following that event. She is also reported to have presented similarly on 2011, at which time her symptoms resolved and no etiology was determined. At that time she had associated headache and fever and there was concern for possible viral meningitis/encephalitis. However CSF studies not c/w infection and patient improved over the course of the day.  Patient does not report any confusion or memory difficulties, headache, dizziness, blurry or double vision, nausea, vomiting, numbness or tingling. She reports no recent illness or injury.       REVIEW OF SYSTEMS:  Constitutional: No fever, chills  Eyes: No visual disturbances  ENT:  No difficulty hearing, tinnitus, vertigo; No sinus or throat pain  Neck: No pain or stiffness  Respiratory: No dyspnea  Cardiovascular: No chest pain  Gastrointestinal: No nausea, vomiting.  Neurological: No headaches, lightheadedness, numbness  A 10-system ROS was performed and is negative except as noted above and/or in the HPI.      PAST MEDICAL & SURGICAL HISTORY:  Migraine headache    C Section       FAMILY HISTORY: No significant neurologic hx      SOCIAL HISTORY: lives with wife. No toxic habits. Works as a .       MEDICATIONS    Home Medications: non    MEDICATIONS  (STANDING):    MEDICATIONS  (PRN):      ALLERGIES    epinephrine (Other)  Sudafed (Other)      VITALS & EXAMINATION    Height (cm): 167.6 (09-20 @ 06:54)    Vital Signs Last 24 Hrs  T(C): 36.9 (20 Sep 2021 11:50), Max: 36.9 (20 Sep 2021 11:50)  T(F): 98.5 (20 Sep 2021 11:50), Max: 98.5 (20 Sep 2021 11:50)  HR: 88 (20 Sep 2021 11:50) (88 - 123)  BP: 143/92 (20 Sep 2021 11:50) (143/92 - 149/110)  RR: 17 (20 Sep 2021 11:50) (16 - 17)  SpO2: 98% (20 Sep 2021 11:50) (98% - 100%)    General Exam:  Constitutional: NAD, pleasant, cooperative  Head: NT/AT   Chest: No signs of resp distress, on room air  Abd: Soft, NTTP  Extremities: No edema  Skin: No rashes, bruising, or discoloration.  	    Neuro Exam:  Mental Status: Patient is alert, awake, oriented to person (able to state name); replies "168-" to place and time, replies "" when asked specifically for the year, replies "1948" when asked specifically for the month. Follows all commands. Recalls 0/3 words at 5 minutes. Able to get from 100 to 84 on serial 7s, but unable to do a 3rd subtraction.                        Language: Speech is clear, fluent with good repetition, however  with hesitancy/word finding difficulty; responses frequently delayed when asked questions or asked to identify objects, at times with no verbal response provided, though she appears to be trying to respond. Identifies "pen" and "watch" with ease, replies "tag" when showed ID lida, unable to identify clipboard and computer though expresses frustration/appears to recognize these objects, and is subsequently able to name "keyboard".     CNs: Pupils minimally reactive (R = 2mm, L = 2mm). Visual acuity with glasses 20/20 OD and OS. VFF. EOMI no nystagmus. V1-3 intact to LT, well developed masseter muscles b/l. No facial asymmetry b/l, full eye closure strength b/l. Hearing grossly normal (rubbing fingers) b/l. Symmetric palate elevation in midline. Gag reflex deferred. Tongue midline, normal movements.    Motor:  Muscle bulk and tone are normal. Minimal tremor noted with extension of b/l UE. Strength is 5/5 in all four extremities both proximally and distally. Intact fine motor movements bilaterally. There is no pronator drift.	     Sensation: Intact to LT/PP b/l throughout.     Cortical: Extinction on DSS (neglect): none    Reflexes: + crossed adductors, 1-2 beats clonus b/l              Biceps(C5)       BR(C6)     Triceps(C7)               Patellar(L4)    Achilles(S1)    Plantar Resp  R	3	          3	             2		        3		    2		Mute  L	3	          3	             2		        3		    2		Mute    Coordination: No dysmetria to FTN/HTS    Gait: No postural instability. Normal stance and tandem gait.         LABS:    CBC                       13.6   8.63  )-----------( 362      ( 20 Sep 2021 08:14 )             40.8     Chem     138  |  104  |  8   ----------------------------<  126<H>  3.8   |  25  |  0.76    Ca    9.5      20 Sep 2021 08:14    TPro  7.1  /  Alb  4.3  /  TBili  0.4  /  DBili  x   /  AST  14  /  ALT  11  /  AlkPhos  55  -    Coags   LFTs LIVER FUNCTIONS - ( 20 Sep 2021 08:14 )  Alb: 4.3 g/dL / Pro: 7.1 g/dL / ALK PHOS: 55 U/L / ALT: 11 U/L / AST: 14 U/L / GGT: x           Lipid Panel   A1c   Cardiac enzymes     U/A Urinalysis Basic - ( 20 Sep 2021 09:59 )    Color: Yellow / Appearance: Slightly Turbid / S.027 / pH: x  Gluc: x / Ketone: Small  / Bili: Negative / Urobili: <2 mg/dL   Blood: x / Protein: 30 mg/dL / Nitrite: Negative   Leuk Esterase: Negative / RBC: 3 /HPF / WBC 2 /HPF   Sq Epi: x / Non Sq Epi: 15 /HPF / Bacteria: Few      STUDIES & IMAGING    21    CT brain:  No hydrocephalus, acute intracranial hemorrhage, mass effect, or brain edema.  No abnormal intracranial enhancement.    CTA brain:  No vascular aneurysm, flow-limiting stenosis, or AVM.    CTA neck:  No flow-limiting stenosis or evidence for arterial dissection.

## 2021-09-20 NOTE — H&P ADULT - HISTORY OF PRESENT ILLNESS
51 y.o. F, , h/o migraine on no meds, presented to ED with altered MS. As per ED note, history was provided to soila Meneses [phone]. Pt woke up at 4 AM confused, unable to find appropriate words and behaved strangely. Pt had similar episode years ago as per ED nite.  No reported weakness of extremities, headache. Pt was brought to ED, all vitals stable, lab not remarkable, CT of head diid not show any acute pathology. Pt was admitted for altered MS for further w/u.    Currently pt is alert, oriented to person, partially to place and time. She denies any pain or discomfort. Pt denies taking any standing meds  51 y.o. F, , h/o migraine on no meds, presented to ED with altered MS. As per ED note, history was provided to soila Meneses [phone]. Pt woke up at 4 AM confused, unable to find appropriate words and behaved strangely. Pt had similar episode years ago as per ED note.  No reported weakness of extremities, headache. Pt was brought to ED, all vitals stable, lab not remarkable, CT of head diid not show any acute pathology. Pt was admitted for altered MS for further w/u.    Currently pt is alert, oriented to person, partially to place and time. She denies any pain or discomfort. Pt denies taking any standing meds

## 2021-09-20 NOTE — ED ADULT TRIAGE NOTE - CHIEF COMPLAINT QUOTE
As per friend on Saturday, pt was "Acting different and appeared confused." Friend states she has been having "shakes" since then but yesterday she had an episode where her eyes rolled back and she was drooling. Pt states she feels different but cannot explain. Denies chest pain, sob, HA, dizziness, vision changes, numbness/tingling, PMHx.    Friend Martha Zaira: [phone] As per friend on Saturday, pt was "Acting different and appeared confused." Friend states she has been having "shakes" since then but yesterday she had an episode where her eyes rolled back and she was drooling. Pt states she feels different but cannot explain. Denies chest pain, sob, HA, dizziness, vision changes, numbness/tingling, PMHx. Pt aaox2 at present.    Friend Martha Zaira: [phone] As per friend, on Saturday pt was "Acting different and appeared confused." Friend states she has been having "shakes" since then but yesterday she had an episode where her eyes rolled back and she was drooling. Pt states she feels different but cannot explain. Denies chest pain, sob, HA, dizziness, vision changes, numbness/tingling, PMHx. Pt aaox2 at present.    Friend Martha Zaira: [phone]

## 2021-09-20 NOTE — ED PROVIDER NOTE - ATTENDING CONTRIBUTION TO CARE
51 year old with ams.  no fever or infecitous symptoms. no focal neuro or speech deficit. similar events in past without elucidation of symptoms.  head ct, labs, neuro cx admit.  low suspicion for meningitis given prior episode and no fever.  maintaining airway

## 2021-09-20 NOTE — ED PROVIDER NOTE - PROGRESS NOTE DETAILS
JONATHAN Woodson: Pt removed her IV line and threw it in the garbage, pt has a 1:1 sitter bedside. Pending results of labs, CTA. JONATHAN Woodson: Paged Neurology, awaiting callback. JONATHAN Woodson: Discussed case with Neurology, will consult.

## 2021-09-21 ENCOUNTER — TRANSCRIPTION ENCOUNTER (OUTPATIENT)
Age: 51
End: 2021-09-21

## 2021-09-21 VITALS
OXYGEN SATURATION: 100 % | RESPIRATION RATE: 20 BRPM | DIASTOLIC BLOOD PRESSURE: 69 MMHG | SYSTOLIC BLOOD PRESSURE: 113 MMHG | TEMPERATURE: 98 F | HEART RATE: 82 BPM

## 2021-09-21 LAB
ALBUMIN SERPL ELPH-MCNC: 4 G/DL — SIGNIFICANT CHANGE UP (ref 3.3–5)
ALP SERPL-CCNC: 49 U/L — SIGNIFICANT CHANGE UP (ref 40–120)
ALT FLD-CCNC: 9 U/L — SIGNIFICANT CHANGE UP (ref 4–33)
ANION GAP SERPL CALC-SCNC: 12 MMOL/L — SIGNIFICANT CHANGE UP (ref 7–14)
AST SERPL-CCNC: 11 U/L — SIGNIFICANT CHANGE UP (ref 4–32)
BASOPHILS # BLD AUTO: 0.02 K/UL — SIGNIFICANT CHANGE UP (ref 0–0.2)
BASOPHILS NFR BLD AUTO: 0.3 % — SIGNIFICANT CHANGE UP (ref 0–2)
BILIRUB SERPL-MCNC: 0.4 MG/DL — SIGNIFICANT CHANGE UP (ref 0.2–1.2)
BUN SERPL-MCNC: 8 MG/DL — SIGNIFICANT CHANGE UP (ref 7–23)
CALCIUM SERPL-MCNC: 9.2 MG/DL — SIGNIFICANT CHANGE UP (ref 8.4–10.5)
CHLORIDE SERPL-SCNC: 101 MMOL/L — SIGNIFICANT CHANGE UP (ref 98–107)
CO2 SERPL-SCNC: 24 MMOL/L — SIGNIFICANT CHANGE UP (ref 22–31)
COVID-19 SPIKE DOMAIN AB INTERP: POSITIVE
COVID-19 SPIKE DOMAIN ANTIBODY RESULT: >250 U/ML — HIGH
CREAT SERPL-MCNC: 0.62 MG/DL — SIGNIFICANT CHANGE UP (ref 0.5–1.3)
CULTURE RESULTS: SIGNIFICANT CHANGE UP
EOSINOPHIL # BLD AUTO: 0.02 K/UL — SIGNIFICANT CHANGE UP (ref 0–0.5)
EOSINOPHIL NFR BLD AUTO: 0.3 % — SIGNIFICANT CHANGE UP (ref 0–6)
GLUCOSE SERPL-MCNC: 92 MG/DL — SIGNIFICANT CHANGE UP (ref 70–99)
HCT VFR BLD CALC: 38.5 % — SIGNIFICANT CHANGE UP (ref 34.5–45)
HGB BLD-MCNC: 12.8 G/DL — SIGNIFICANT CHANGE UP (ref 11.5–15.5)
IANC: 6 K/UL — SIGNIFICANT CHANGE UP (ref 1.5–8.5)
IMM GRANULOCYTES NFR BLD AUTO: 0.3 % — SIGNIFICANT CHANGE UP (ref 0–1.5)
LYME C6 AB IGG/IGM EIA REFLEX WESTERN BL: SIGNIFICANT CHANGE UP
LYMPHOCYTES # BLD AUTO: 1.01 K/UL — SIGNIFICANT CHANGE UP (ref 1–3.3)
LYMPHOCYTES # BLD AUTO: 13.5 % — SIGNIFICANT CHANGE UP (ref 13–44)
MCHC RBC-ENTMCNC: 28.4 PG — SIGNIFICANT CHANGE UP (ref 27–34)
MCHC RBC-ENTMCNC: 33.2 GM/DL — SIGNIFICANT CHANGE UP (ref 32–36)
MCV RBC AUTO: 85.6 FL — SIGNIFICANT CHANGE UP (ref 80–100)
MONOCYTES # BLD AUTO: 0.4 K/UL — SIGNIFICANT CHANGE UP (ref 0–0.9)
MONOCYTES NFR BLD AUTO: 5.4 % — SIGNIFICANT CHANGE UP (ref 2–14)
NEUTROPHILS # BLD AUTO: 6 K/UL — SIGNIFICANT CHANGE UP (ref 1.8–7.4)
NEUTROPHILS NFR BLD AUTO: 80.2 % — HIGH (ref 43–77)
NRBC # BLD: 0 /100 WBCS — SIGNIFICANT CHANGE UP
NRBC # FLD: 0 K/UL — SIGNIFICANT CHANGE UP
PLATELET # BLD AUTO: 309 K/UL — SIGNIFICANT CHANGE UP (ref 150–400)
POTASSIUM SERPL-MCNC: 3.9 MMOL/L — SIGNIFICANT CHANGE UP (ref 3.5–5.3)
POTASSIUM SERPL-SCNC: 3.9 MMOL/L — SIGNIFICANT CHANGE UP (ref 3.5–5.3)
PROT SERPL-MCNC: 6.5 G/DL — SIGNIFICANT CHANGE UP (ref 6–8.3)
RBC # BLD: 4.5 M/UL — SIGNIFICANT CHANGE UP (ref 3.8–5.2)
RBC # FLD: 13.8 % — SIGNIFICANT CHANGE UP (ref 10.3–14.5)
SARS-COV-2 IGG+IGM SERPL QL IA: >250 U/ML — HIGH
SARS-COV-2 IGG+IGM SERPL QL IA: POSITIVE
SODIUM SERPL-SCNC: 137 MMOL/L — SIGNIFICANT CHANGE UP (ref 135–145)
SPECIMEN SOURCE: SIGNIFICANT CHANGE UP
WBC # BLD: 7.47 K/UL — SIGNIFICANT CHANGE UP (ref 3.8–10.5)
WBC # FLD AUTO: 7.47 K/UL — SIGNIFICANT CHANGE UP (ref 3.8–10.5)

## 2021-09-21 PROCEDURE — 99222 1ST HOSP IP/OBS MODERATE 55: CPT

## 2021-09-21 NOTE — EEG REPORT - NS EEG TEXT BOX
[facility]  Comprehensive Epilepsy Center  Report of Routine EEG with Video    [facility]: [address], [place] [address], Phone [phone]  [facility]: [address] [place] [address], Phone [phone]  [place] Office: [address], [address], [place] [address] Phone [phone]    [facility]: [address] [place] [address], Phone [phone]  [place] Office: [address] [place] [address], Phone [phone]    Patient Name: LEONELA KENNEDY    Age: 51 year  : 1970  Patient ID: -, MRN #: -, [facility]: [facility]-  Referring Physician: akil rebollar  EEG #: [id number]    Study Date: 2021     Technical Information:					  On Instrument: -  Placement and Labeling of Electrodes:  The EEG was performed utilizing 20 channels referential EEG connections (coronal over temporal over parasagittal montage) using all standard 10-20 electrode placements with EKG.  Recording was at a sampling rate of 256 samples per second per channel.  Time synchronized digital video recording was done simultaneously with EEG recording.  A low light infrared camera was used for low light recording.  Hiro and seizure detection algorithms were utilized.    History:   ROUTINE EEG AT BEDSIDE [facility] 22  51 YEAR OLD FEMALE  COR: AWAKE / DROWSY  P/W: ENCEPHALOPATHY  PMH: MIGRAINE HEADACHE  HV: NOT COMPLETED DUE TO PANDEMIC  PHOTIC:COMPLETED  A&OX5  CONCERN FOR SEIZURES      Pertinent Medication  Tylenol  Zofran  ALUMINUM HYDROXIDE  Melatonin    Study Interpretation:  Findings: The background was continuous and reactive. During wakefulness, the posterior dominant rhythm consisted of symmetric, well-modulated 10 Hz activity, with amplitude to 30 uV, that attenuated to eye opening.     Background Slowing:  No generalized background slowing was present.    Focal Slowing:   None were present.    Sleep Background:  Drowsiness was characterized by fragmentation, attenuation, and slowing of the background activity.    Stage II sleep transients were not recorded.    Other Non-Epileptiform Findings:  None were present.    Interictal Epileptiform Activity:   None were present.    Events:  Clinical events: None recorded.  Seizures: None recorded.    Activation Procedures:   Hyperventilation was not performed.    Photic stimulation was performed and did not elicit any abnormalities.      Artifacts:  Intermittent myogenic and movement artifacts were noted.    EEG Summary / Classification:  Normal EEG     EEG Impression / Clinical Correlate:  Normal EEG study.  No epileptiform pattern or seizure seen.    Alex Polo MD  Attending Physician, [facility] [place]

## 2021-09-21 NOTE — DISCHARGE NOTE NURSING/CASE MANAGEMENT/SOCIAL WORK - PATIENT PORTAL LINK FT
You can access the FollowMyHealth Patient Portal offered by [facility] by registering at the following website: [email or web address]. By joining [facility] [employer]’s FollowMyHealth portal, you will also be able to view your health information using other applications (apps) compatible with our system.

## 2021-09-21 NOTE — DISCHARGE NOTE PROVIDER - HOSPITAL COURSE
Patient 52 y/o woman hx of migraines p/w episode of AMS, shaking following by unresponsiveness. Pt has hx of two prior episodes that were similar back in December and March of this year. Pt is feeling much better today. 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 Pt's mood is good, normal affect, but very talkative, and saying that she recognizes multiple people in the room. She is fully oriented, answering questions appropriately, Follows commands, language normal, strength 5/5 throughout.  Possible seizure event with full return to baseline.   - routine EEG  - Can get rest of workup as outpatient. She can f/u with me [phone] [facility] for MRI and continuous EEG  - Would get Psych for anxiety and odd affect, possible otilia.      Patient 50 y/o woman hx of migraines p/w episode of AMS, shaking following by unresponsiveness. Pt has hx of two prior episodes that were similar back in December and March of this year. Patient seen by neurology team. Patient is now fully oriented, answering questions appropriately, Following commands, language normal, strength 5/5 throughout.  Per neuro 2/2 Possible seizure event with full return to baseline.   Patient requesting to leave against medical advice. Patient refusing to wait for EG results or having MRI performed at this time. Case discussed with medical attending and neurology. Per neuro patient can complete    rest of workup as outpatient. for MRI and continuous EEG.   Neuro made recommendations for psych consult for anxiety and odd affect, possible otilia. Patient is refusing consult.   Patient wishes to leave the hospital against medical advice. Patient is A&O x3 and has full capacity to make his or her own medical decisions. Pt was informed of their medical conditions, benefits, and alternatives to treatment as well as the risks of refusing treatment and the seriousness of leaving against medical advice such as the risks of heart attack, stroke, seizure, and even death were fully explained to the patient.  After expressing full understanding, patient then signs out against medical advice witnessed by the nurse.  Attending made aware.

## 2021-09-21 NOTE — ED ADULT NURSE REASSESSMENT NOTE - NS ED NURSE REASSESS COMMENT FT1
RN Break Coverage, Handoff receive pt in bed calm, A*Ox3, 1;1 in progress safety maintained.
Pt A&O x 4. Able to make needs known, able to engage in conversation without difficulty. Pt verbalized feeling better. 1:1 observation in progress. Patient in NAD. Stretcher in lowest position, wheels locked, appropriate side rails in place, call bell in reach.
Report given to ESSU 1 RN. Patient A&O x 4 at time of transfer. Patient made aware of transfer.

## 2021-09-21 NOTE — DISCHARGE NOTE PROVIDER - CARE PROVIDER_API CALL
Nona Miramontes)  Neurology  General  [address] [place] [address]  Phone: [phone]  Fax: [phone]  Follow Up Time: 1 week

## 2021-09-21 NOTE — CHART NOTE - NSCHARTNOTEFT_GEN_A_CORE
Spoke to pt's roommate Martha on the phone [phone] about more information. She just spoke to Neurology consult about pt's symptoms during last 2 days (see Neuro consult note). She stated that she is the roommate of the pt and very close to the patient. However, pt is officially  to Nicole Mello who is also pt's Health Care Proxy as per Martha. Nicole's phone #: [phone].
Called by nurse to evaluate patient who wishes to leave the hospital against medical advice. Patient is A&O x3 and has full capacity to make his or her own medical decisions. Pt was informed of their medical conditions, benefits, and alternatives to treatment as well as the risks of refusing treatment and the seriousness of leaving against medical advice such as the risks of heart attack, stroke, seizure, and even death were fully explained to the patient.  After expressing full understanding, patient then signs out against medical advice witnessed by the nurse.  Attending made aware.

## 2021-09-22 PROBLEM — G43.909 MIGRAINE, UNSPECIFIED, NOT INTRACTABLE, WITHOUT STATUS MIGRAINOSUS: Chronic | Status: ACTIVE | Noted: 2021-09-20

## 2021-10-06 ENCOUNTER — APPOINTMENT (OUTPATIENT)
Dept: NEUROLOGY | Facility: CLINIC | Age: 51
End: 2021-10-06
Payer: COMMERCIAL

## 2021-10-06 VITALS
BODY MASS INDEX: 23.49 KG/M2 | WEIGHT: 141 LBS | SYSTOLIC BLOOD PRESSURE: 109 MMHG | HEIGHT: 65 IN | DIASTOLIC BLOOD PRESSURE: 70 MMHG | HEART RATE: 77 BPM

## 2021-10-06 DIAGNOSIS — F41.9 ANXIETY DISORDER, UNSPECIFIED: ICD-10-CM

## 2021-10-06 DIAGNOSIS — Z86.69 PERSONAL HISTORY OF OTHER DISEASES OF THE NERVOUS SYSTEM AND SENSE ORGANS: ICD-10-CM

## 2021-10-06 DIAGNOSIS — Z78.9 OTHER SPECIFIED HEALTH STATUS: ICD-10-CM

## 2021-10-06 DIAGNOSIS — R56.9 UNSPECIFIED CONVULSIONS: ICD-10-CM

## 2021-10-06 DIAGNOSIS — F43.9 REACTION TO SEVERE STRESS, UNSPECIFIED: ICD-10-CM

## 2021-10-06 PROCEDURE — 99215 OFFICE O/P EST HI 40 MIN: CPT

## 2021-10-06 NOTE — PHYSICAL EXAM
[General Appearance - Alert] : alert [General Appearance - In No Acute Distress] : in no acute distress [General Appearance - Well-Appearing] : healthy appearing [Oriented To Time, Place, And Person] : oriented to person, place, and time [Impaired Insight] : insight and judgment were intact [FreeTextEntry1] : blunted affect [Person] : oriented to person [Place] : oriented to place [Time] : oriented to time [Concentration Intact] : normal concentrating ability [Visual Intact] : visual attention was ~T not ~L decreased [Naming Objects] : no difficulty naming common objects [Repeating Phrases] : no difficulty repeating a phrase [Writing A Sentence] : no difficulty writing a sentence [Fluency] : fluency intact [Comprehension] : comprehension intact [Reading] : reading intact [Past History] : adequate knowledge of personal past history [Cranial Nerves Optic (II)] : visual acuity intact bilaterally,  visual fields full to confrontation, pupils equal round and reactive to light [Cranial Nerves Oculomotor (III)] : extraocular motion intact [Cranial Nerves Trigeminal (V)] : facial sensation intact symmetrically [Cranial Nerves Facial (VII)] : face symmetrical [Cranial Nerves Vestibulocochlear (VIII)] : hearing was intact bilaterally [Cranial Nerves Glossopharyngeal (IX)] : tongue and palate midline [Cranial Nerves Accessory (XI - Cranial And Spinal)] : head turning and shoulder shrug symmetric [Cranial Nerves Hypoglossal (XII)] : there was no tongue deviation with protrusion [Motor Strength] : muscle strength was normal in all four extremities [No Muscle Atrophy] : normal bulk in all four extremities [Sensation Tactile Decrease] : light touch was intact [Balance] : balance was intact [Past-pointing] : there was no past-pointing [Tremor] : no tremor present [1+] : Ankle jerk left 1+ [Plantar Reflex Right Only] : normal on the right [Plantar Reflex Left Only] : normal on the left [Sclera] : the sclera and conjunctiva were normal [PERRL With Normal Accommodation] : pupils were equal in size, round, reactive to light, with normal accommodation [Extraocular Movements] : extraocular movements were intact [Optic Disc Abnormality] : the optic disc were normal in size and color [Oropharynx] : the oropharynx was normal [Outer Ear] : the ears and nose were normal in appearance [Neck Appearance] : the appearance of the neck was normal [Neck Cervical Mass (___cm)] : no neck mass was observed [Jugular Venous Distention Increased] : there was no jugular-venous distention [Thyroid Diffuse Enlargement] : the thyroid was not enlarged [Thyroid Nodule] : there were no palpable thyroid nodules [Auscultation Breath Sounds / Voice Sounds] : lungs were clear to auscultation bilaterally [Heart Rate And Rhythm] : heart rate was normal and rhythm regular [Edema] : there was no peripheral edema [No Spinal Tenderness] : no spinal tenderness [Abnormal Walk] : normal gait [Nail Clubbing] : no clubbing  or cyanosis of the fingernails [Skin Color & Pigmentation] : normal skin color and pigmentation [Motor Tone] : muscle strength and tone were normal [] : no rash

## 2021-10-06 NOTE — HISTORY OF PRESENT ILLNESS
[FreeTextEntry1] : 50 y/o woman hx of migraines p/w episode of AMS, shaking following by unresponsiveness. Pt here as a follow up fro, recent hospitalization at [facility] in September. She woke from sleep having shaking, and difficulty speaking. Slowly returned to baseline and was normal by the next day. Routine EEG was normal. Pt has hx of two prior episodes that were similar back in December and March of this year. Also has episode reported by her partner 10 years ago which cleared up while she was in the ED. This occurs while she is asleep early in the morning.  Has been having stress from relationship and work. She is  from her wife currently. 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 She feels that everything is mostly related to anxiety which she feels at time starting in her stomach. No hx of seizure as a child.

## 2021-10-06 NOTE — ASSESSMENT
[FreeTextEntry1] : 50 y/o woman with hx of migraines, anxiety, p/w 3-4 lifetime episodes of waking up out of sleep with shaking and unresponsiveness followed by disorientation and confusion for several hours. Pt had brief workup in the hospital which included CTH and routine EEG which were normal. Pt has not had any further episodes like this but describes feelings of anxiety starting in her stomach. \par Episodes could be related to her anxiety, but concerning that they are waking her out of sleep and not occurring while she is awake, which would be more suspicious for seizure events. The anxiety in her stomach feeling could  indicate a temporal lobe focus.\par \par - 24hour EEG\par - Psychiatry referral for her anxiety- José Miguel Go and Dr. Varner\katarina - Will set up appointment for when results of the EEG are back. \par \par

## 2021-10-21 ENCOUNTER — TRANSCRIPTION ENCOUNTER (OUTPATIENT)
Age: 51
End: 2021-10-21

## 2021-11-19 ENCOUNTER — APPOINTMENT (OUTPATIENT)
Dept: NEUROLOGY | Facility: CLINIC | Age: 51
End: 2021-11-19

## 2023-11-20 NOTE — H&P ADULT - ASSESSMENT
51 y.o. F, , h/o migraine presented with altered MS, etiology is not clear, neuro exam non-focal. CT of head negative for acute pathology. All the labs not remarkable. No signs of acute infection. need to r/o seizure activity, TIA's or encephalitis. R/O pscychiatric etiology  
No

## 2024-01-12 NOTE — ED ADULT NURSE NOTE - HIV OFFER
Voicemail message left confirming patient's appointment for 1-15-24. Patient given office address, arrival time, to bring ins card & photo id.   
Opt out

## 2024-07-23 ENCOUNTER — APPOINTMENT (OUTPATIENT)
Dept: ORTHOPEDIC SURGERY | Facility: CLINIC | Age: 54
End: 2024-07-23
Payer: COMMERCIAL

## 2024-07-23 VITALS
WEIGHT: 151 LBS | SYSTOLIC BLOOD PRESSURE: 120 MMHG | DIASTOLIC BLOOD PRESSURE: 78 MMHG | HEART RATE: 57 BPM | BODY MASS INDEX: 25.16 KG/M2 | HEIGHT: 65 IN

## 2024-07-23 DIAGNOSIS — M79.89 OTHER SPECIFIED SOFT TISSUE DISORDERS: ICD-10-CM

## 2024-07-23 DIAGNOSIS — M67.479 GANGLION, UNSPECIFIED ANKLE AND FOOT: ICD-10-CM

## 2024-07-23 DIAGNOSIS — M62.89 OTHER SPECIFIED DISORDERS OF MUSCLE: ICD-10-CM

## 2024-07-23 PROCEDURE — 73630 X-RAY EXAM OF FOOT: CPT | Mod: 50

## 2024-07-23 PROCEDURE — 99202 OFFICE O/P NEW SF 15 MIN: CPT

## 2024-07-23 NOTE — DISCUSSION/SUMMARY
[de-identified] : Discussed with patient nature of condition, potential course / sequelae, options reviewed.  NB shoe wear, activity as tolerated, ultrasound assessment L ankle rule-out lipomatous mass adipose tissue / possible cyst.  Return to office in 2 - 3 weeks / PRN, sonographic study review TC.  All questions answered.

## 2024-07-23 NOTE — HISTORY OF PRESENT ILLNESS
[Other: ____] : [unfilled] [FreeTextEntry1] : 54 F, no significant PMH, presents with soft tissue mass near left >right lateral malleous. She noticed these about two years though recently they have been a bit larger. She endorses occasional feeling of ankle stiffness/fullness. She was previously evaluated by orthopedist and told that this was likely age related. She has never done any formal treatment for this. She presents wearing slip on shoes.

## 2024-07-23 NOTE — PHYSICAL EXAM
[de-identified] : Extremity: +equinus (releases) B LE, subcutaneous soft tissue mass anterolateral aspect B ankles (L > R).  Nontender B distal fibula, peroneals, syndesmosis, Achilles, medial malleolus, hindfoot ST, midfoot LF and PTT insertional, and forefoot / base 5th metatarsal.  Calves soft and nontender, sensorimotor unchanged, 5 / 5 evertor strength B ankles, skin intact B LE.  AOx3, mood / affect normal. 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

## 2024-07-24 ENCOUNTER — APPOINTMENT (OUTPATIENT)
Dept: ORTHOPEDIC SURGERY | Facility: CLINIC | Age: 54
End: 2024-07-24

## 2024-07-30 ENCOUNTER — OUTPATIENT (OUTPATIENT)
Dept: OUTPATIENT SERVICES | Facility: HOSPITAL | Age: 54
LOS: 1 days | End: 2024-07-30
Payer: COMMERCIAL

## 2024-07-30 ENCOUNTER — APPOINTMENT (OUTPATIENT)
Dept: ULTRASOUND IMAGING | Facility: CLINIC | Age: 54
End: 2024-07-30
Payer: COMMERCIAL

## 2024-07-30 DIAGNOSIS — M79.89 OTHER SPECIFIED SOFT TISSUE DISORDERS: ICD-10-CM

## 2024-07-30 PROCEDURE — 76882 US LMTD JT/FCL EVL NVASC XTR: CPT

## 2024-07-30 PROCEDURE — 76882 US LMTD JT/FCL EVL NVASC XTR: CPT | Mod: 26,LT

## 2024-11-07 NOTE — ED PROVIDER NOTE - CPE EDP CARDIAC NORM
"Faxed release of information(s) to psych fax# [phone]/fax# [phone]. I have attached a confirmation faxed message(s) below and placed fax in my file cabinet. Fax will be sent to scan after 3 months!       Your fax has been successfully sent to [phone] at [phone].  ------------------------------------------------------------  From: [id number]  ------------------------------------------------------------  11/7/2024 11:10:00 AM Transmission Record          Sent to [phone] with remote ID "[email or web address] Connector"          Result: (0/339;0/0) Success          Page record: 1 - 2          Elapsed time: 01:21 on channel 64       Awaiting ENT confirmation faxed  " normal...

## 2025-06-05 NOTE — ED PROVIDER NOTE - CADM POA URETHRAL CATHETER
No Information: This plan will allow you to select a body location and will not render the procedure on the note output. It will note the location you select in the morphology. Detail Level: Simple

## 2025-08-27 ENCOUNTER — EMERGENCY (EMERGENCY)
Facility: HOSPITAL | Age: 55
LOS: 1 days | End: 2025-08-27
Attending: EMERGENCY MEDICINE | Admitting: EMERGENCY MEDICINE
Payer: COMMERCIAL

## 2025-08-27 VITALS
DIASTOLIC BLOOD PRESSURE: 84 MMHG | TEMPERATURE: 98 F | HEIGHT: 66 IN | SYSTOLIC BLOOD PRESSURE: 124 MMHG | HEART RATE: 64 BPM | RESPIRATION RATE: 16 BRPM | OXYGEN SATURATION: 98 % | WEIGHT: 156.09 LBS

## 2025-08-27 LAB
APTT BLD: 29.7 SEC — SIGNIFICANT CHANGE UP (ref 26.1–36.8)
BASOPHILS # BLD AUTO: 0.03 K/UL — SIGNIFICANT CHANGE UP (ref 0–0.2)
BASOPHILS NFR BLD AUTO: 0.6 % — SIGNIFICANT CHANGE UP (ref 0–2)
EOSINOPHIL # BLD AUTO: 0.11 K/UL — SIGNIFICANT CHANGE UP (ref 0–0.5)
EOSINOPHIL NFR BLD AUTO: 2.2 % — SIGNIFICANT CHANGE UP (ref 0–6)
HCT VFR BLD CALC: 40.4 % — SIGNIFICANT CHANGE UP (ref 34.5–45)
HGB BLD-MCNC: 13.3 G/DL — SIGNIFICANT CHANGE UP (ref 11.5–15.5)
IMM GRANULOCYTES # BLD AUTO: 0.01 K/UL — SIGNIFICANT CHANGE UP (ref 0–0.07)
IMM GRANULOCYTES NFR BLD AUTO: 0.2 % — SIGNIFICANT CHANGE UP (ref 0–0.9)
INR BLD: 0.91 RATIO — SIGNIFICANT CHANGE UP (ref 0.85–1.16)
LYMPHOCYTES # BLD AUTO: 1.48 K/UL — SIGNIFICANT CHANGE UP (ref 1–3.3)
LYMPHOCYTES NFR BLD AUTO: 29.9 % — SIGNIFICANT CHANGE UP (ref 13–44)
MCHC RBC-ENTMCNC: 29 PG — SIGNIFICANT CHANGE UP (ref 27–34)
MCHC RBC-ENTMCNC: 32.9 G/DL — SIGNIFICANT CHANGE UP (ref 32–36)
MCV RBC AUTO: 88 FL — SIGNIFICANT CHANGE UP (ref 80–100)
MONOCYTES # BLD AUTO: 0.46 K/UL — SIGNIFICANT CHANGE UP (ref 0–0.9)
MONOCYTES NFR BLD AUTO: 9.3 % — SIGNIFICANT CHANGE UP (ref 2–14)
NEUTROPHILS # BLD AUTO: 2.86 K/UL — SIGNIFICANT CHANGE UP (ref 1.8–7.4)
NEUTROPHILS NFR BLD AUTO: 57.8 % — SIGNIFICANT CHANGE UP (ref 43–77)
NRBC # BLD AUTO: 0 K/UL — SIGNIFICANT CHANGE UP (ref 0–0)
NRBC # FLD: 0 K/UL — SIGNIFICANT CHANGE UP (ref 0–0)
NRBC BLD AUTO-RTO: 0 /100 WBCS — SIGNIFICANT CHANGE UP (ref 0–0)
PLATELET # BLD AUTO: 269 K/UL — SIGNIFICANT CHANGE UP (ref 150–400)
PMV BLD: 9.4 FL — SIGNIFICANT CHANGE UP (ref 7–13)
PROTHROM AB SERPL-ACNC: 10.6 SEC — SIGNIFICANT CHANGE UP (ref 9.9–13.4)
RBC # BLD: 4.59 M/UL — SIGNIFICANT CHANGE UP (ref 3.8–5.2)
RBC # FLD: 14.1 % — SIGNIFICANT CHANGE UP (ref 10.3–14.5)
WBC # BLD: 4.95 K/UL — SIGNIFICANT CHANGE UP (ref 3.8–10.5)
WBC # FLD AUTO: 4.95 K/UL — SIGNIFICANT CHANGE UP (ref 3.8–10.5)

## 2025-08-27 PROCEDURE — 99285 EMERGENCY DEPT VISIT HI MDM: CPT

## 2025-08-27 RX ORDER — PHENYLEPHRINE HYDROCHLORIDE 25 MG/ML
1 SOLUTION OPHTHALMIC ONCE
Refills: 0 | Status: COMPLETED | OUTPATIENT
Start: 2025-08-27 | End: 2025-08-27

## 2025-08-27 RX ADMIN — PHENYLEPHRINE HYDROCHLORIDE 1 DROP(S): 25 SOLUTION OPHTHALMIC at 22:56

## 2025-08-28 ENCOUNTER — OUTPATIENT (OUTPATIENT)
Dept: EMERGENCY DEPT | Facility: HOSPITAL | Age: 55
LOS: 1 days | End: 2025-08-28
Payer: COMMERCIAL

## 2025-08-28 VITALS
WEIGHT: 156.75 LBS | TEMPERATURE: 98 F | RESPIRATION RATE: 16 BRPM | OXYGEN SATURATION: 99 % | DIASTOLIC BLOOD PRESSURE: 77 MMHG | HEART RATE: 72 BPM | SYSTOLIC BLOOD PRESSURE: 110 MMHG | HEIGHT: 66 IN

## 2025-08-28 VITALS
DIASTOLIC BLOOD PRESSURE: 85 MMHG | TEMPERATURE: 98 F | RESPIRATION RATE: 15 BRPM | OXYGEN SATURATION: 96 % | SYSTOLIC BLOOD PRESSURE: 122 MMHG | HEART RATE: 67 BPM

## 2025-08-28 DIAGNOSIS — Z98.890 OTHER SPECIFIED POSTPROCEDURAL STATES: Chronic | ICD-10-CM

## 2025-08-28 DIAGNOSIS — H33.21 SEROUS RETINAL DETACHMENT, RIGHT EYE: ICD-10-CM

## 2025-08-28 LAB
ALBUMIN SERPL ELPH-MCNC: 3.6 G/DL — SIGNIFICANT CHANGE UP (ref 3.3–5)
ALBUMIN SERPL ELPH-MCNC: 4.2 G/DL — SIGNIFICANT CHANGE UP (ref 3.3–5)
ALP SERPL-CCNC: 61 U/L — SIGNIFICANT CHANGE UP (ref 30–120)
ALP SERPL-CCNC: 63 U/L — SIGNIFICANT CHANGE UP (ref 40–120)
ALT FLD-CCNC: 13 U/L — SIGNIFICANT CHANGE UP (ref 4–33)
ALT FLD-CCNC: 19 U/L — SIGNIFICANT CHANGE UP (ref 10–60)
ANION GAP SERPL CALC-SCNC: 12 MMOL/L — SIGNIFICANT CHANGE UP (ref 7–14)
ANION GAP SERPL CALC-SCNC: 8 MMOL/L — SIGNIFICANT CHANGE UP (ref 5–17)
AST SERPL-CCNC: 15 U/L — SIGNIFICANT CHANGE UP (ref 4–32)
AST SERPL-CCNC: 16 U/L — SIGNIFICANT CHANGE UP (ref 10–40)
BASOPHILS # BLD AUTO: 0.01 K/UL — SIGNIFICANT CHANGE UP (ref 0–0.2)
BASOPHILS NFR BLD AUTO: 0.2 % — SIGNIFICANT CHANGE UP (ref 0–2)
BILIRUB SERPL-MCNC: 0.2 MG/DL — SIGNIFICANT CHANGE UP (ref 0.2–1.2)
BILIRUB SERPL-MCNC: 0.3 MG/DL — SIGNIFICANT CHANGE UP (ref 0.2–1.2)
BLD GP AB SCN SERPL QL: NEGATIVE — SIGNIFICANT CHANGE UP
BUN SERPL-MCNC: 14 MG/DL — SIGNIFICANT CHANGE UP (ref 7–23)
BUN SERPL-MCNC: 19 MG/DL — SIGNIFICANT CHANGE UP (ref 7–23)
CALCIUM SERPL-MCNC: 8.8 MG/DL — SIGNIFICANT CHANGE UP (ref 8.4–10.5)
CALCIUM SERPL-MCNC: 9.4 MG/DL — SIGNIFICANT CHANGE UP (ref 8.4–10.5)
CHLORIDE SERPL-SCNC: 102 MMOL/L — SIGNIFICANT CHANGE UP (ref 98–107)
CHLORIDE SERPL-SCNC: 105 MMOL/L — SIGNIFICANT CHANGE UP (ref 96–108)
CO2 SERPL-SCNC: 25 MMOL/L — SIGNIFICANT CHANGE UP (ref 22–31)
CO2 SERPL-SCNC: 28 MMOL/L — SIGNIFICANT CHANGE UP (ref 22–31)
CREAT SERPL-MCNC: 0.8 MG/DL — SIGNIFICANT CHANGE UP (ref 0.5–1.3)
CREAT SERPL-MCNC: 0.83 MG/DL — SIGNIFICANT CHANGE UP (ref 0.5–1.3)
EGFR: 84 ML/MIN/1.73M2 — SIGNIFICANT CHANGE UP
EGFR: 84 ML/MIN/1.73M2 — SIGNIFICANT CHANGE UP
EGFR: 88 ML/MIN/1.73M2 — SIGNIFICANT CHANGE UP
EGFR: 88 ML/MIN/1.73M2 — SIGNIFICANT CHANGE UP
EOSINOPHIL # BLD AUTO: 0.04 K/UL — SIGNIFICANT CHANGE UP (ref 0–0.5)
EOSINOPHIL NFR BLD AUTO: 0.8 % — SIGNIFICANT CHANGE UP (ref 0–6)
GLUCOSE SERPL-MCNC: 83 MG/DL — SIGNIFICANT CHANGE UP (ref 70–99)
GLUCOSE SERPL-MCNC: 93 MG/DL — SIGNIFICANT CHANGE UP (ref 70–99)
HCG SERPL-ACNC: 2 MIU/ML — SIGNIFICANT CHANGE UP
HCT VFR BLD CALC: 41.8 % — SIGNIFICANT CHANGE UP (ref 34.5–45)
HGB BLD-MCNC: 13.5 G/DL — SIGNIFICANT CHANGE UP (ref 11.5–15.5)
IMM GRANULOCYTES # BLD AUTO: 0.02 K/UL — SIGNIFICANT CHANGE UP (ref 0–0.07)
IMM GRANULOCYTES NFR BLD AUTO: 0.4 % — SIGNIFICANT CHANGE UP (ref 0–0.9)
LYMPHOCYTES # BLD AUTO: 0.94 K/UL — LOW (ref 1–3.3)
LYMPHOCYTES NFR BLD AUTO: 18.7 % — SIGNIFICANT CHANGE UP (ref 13–44)
MCHC RBC-ENTMCNC: 28.7 PG — SIGNIFICANT CHANGE UP (ref 27–34)
MCHC RBC-ENTMCNC: 32.3 G/DL — SIGNIFICANT CHANGE UP (ref 32–36)
MCV RBC AUTO: 88.7 FL — SIGNIFICANT CHANGE UP (ref 80–100)
MONOCYTES # BLD AUTO: 0.4 K/UL — SIGNIFICANT CHANGE UP (ref 0–0.9)
MONOCYTES NFR BLD AUTO: 7.9 % — SIGNIFICANT CHANGE UP (ref 2–14)
NEUTROPHILS # BLD AUTO: 3.63 K/UL — SIGNIFICANT CHANGE UP (ref 1.8–7.4)
NEUTROPHILS NFR BLD AUTO: 72 % — SIGNIFICANT CHANGE UP (ref 43–77)
NRBC # BLD AUTO: 0 K/UL — SIGNIFICANT CHANGE UP (ref 0–0)
NRBC # FLD: 0 K/UL — SIGNIFICANT CHANGE UP (ref 0–0)
NRBC BLD AUTO-RTO: 0 /100 WBCS — SIGNIFICANT CHANGE UP (ref 0–0)
PLATELET # BLD AUTO: 263 K/UL — SIGNIFICANT CHANGE UP (ref 150–400)
PMV BLD: 9.3 FL — SIGNIFICANT CHANGE UP (ref 7–13)
POTASSIUM SERPL-MCNC: 4.4 MMOL/L — SIGNIFICANT CHANGE UP (ref 3.5–5.3)
POTASSIUM SERPL-MCNC: 4.6 MMOL/L — SIGNIFICANT CHANGE UP (ref 3.5–5.3)
POTASSIUM SERPL-SCNC: 4.4 MMOL/L — SIGNIFICANT CHANGE UP (ref 3.5–5.3)
POTASSIUM SERPL-SCNC: 4.6 MMOL/L — SIGNIFICANT CHANGE UP (ref 3.5–5.3)
PROT SERPL-MCNC: 6.8 G/DL — SIGNIFICANT CHANGE UP (ref 6–8.3)
PROT SERPL-MCNC: 7.1 G/DL — SIGNIFICANT CHANGE UP (ref 6–8.3)
RBC # BLD: 4.71 M/UL — SIGNIFICANT CHANGE UP (ref 3.8–5.2)
RBC # FLD: 14.3 % — SIGNIFICANT CHANGE UP (ref 10.3–14.5)
RH IG SCN BLD-IMP: POSITIVE — SIGNIFICANT CHANGE UP
SODIUM SERPL-SCNC: 139 MMOL/L — SIGNIFICANT CHANGE UP (ref 135–145)
SODIUM SERPL-SCNC: 141 MMOL/L — SIGNIFICANT CHANGE UP (ref 135–145)
WBC # BLD: 5.04 K/UL — SIGNIFICANT CHANGE UP (ref 3.8–10.5)
WBC # FLD AUTO: 5.04 K/UL — SIGNIFICANT CHANGE UP (ref 3.8–10.5)

## 2025-08-28 PROCEDURE — C1889: CPT

## 2025-08-28 PROCEDURE — C1784: CPT

## 2025-08-28 PROCEDURE — 93005 ELECTROCARDIOGRAM TRACING: CPT

## 2025-08-28 PROCEDURE — 36415 COLL VENOUS BLD VENIPUNCTURE: CPT

## 2025-08-28 PROCEDURE — 85025 COMPLETE CBC W/AUTO DIFF WBC: CPT

## 2025-08-28 PROCEDURE — 67108 REPAIR DETACHED RETINA: CPT | Mod: RT

## 2025-08-28 PROCEDURE — 84702 CHORIONIC GONADOTROPIN TEST: CPT

## 2025-08-28 PROCEDURE — 93010 ELECTROCARDIOGRAM REPORT: CPT | Mod: 77

## 2025-08-28 PROCEDURE — 80053 COMPREHEN METABOLIC PANEL: CPT

## 2025-08-28 DEVICE — LASER PROBE 23G CONSTELLATION: Type: IMPLANTABLE DEVICE | Status: FUNCTIONAL

## 2025-08-28 DEVICE — IMPLANTABLE DEVICE: Type: IMPLANTABLE DEVICE | Status: FUNCTIONAL

## 2025-08-28 DEVICE — GS C3F8 PERFLUOROPROPANE IOL 2.5 L 20GM: Type: IMPLANTABLE DEVICE | Status: FUNCTIONAL

## 2025-08-28 DEVICE — PERFLUORON 7ML KIT: Type: IMPLANTABLE DEVICE | Status: FUNCTIONAL

## (undated) DEVICE — SYE-LASER - CONSTELLATION MACHINE #2 1003466901X: Type: DURABLE MEDICAL EQUIPMENT

## (undated) DEVICE — VENODYNE/SCD SLEEVE CALF MEDIUM

## (undated) DEVICE — ELCTR BIPOLAR CORD BAUSCH & LOMB 12FT DISP

## (undated) DEVICE — ELCTR BIPOLAR CORD J&J 12FT DISP

## (undated) DEVICE — AUTO GAS FILL CONSTELLATION

## (undated) DEVICE — SOL BALANCE SALT 15ML

## (undated) DEVICE — SOL IRR BAL SALT + 500ML

## (undated) DEVICE — DIATHERMY PROBE 25GA

## (undated) DEVICE — DRAPE MICROSCOPE RESIGHT

## (undated) DEVICE — PACK CONSTELLATION POST 23G 10K

## (undated) DEVICE — DRAPE STERI-DRAPE INCISE 13X13"

## (undated) DEVICE — Device

## (undated) DEVICE — PACK VITRECTOMY

## (undated) DEVICE — CANNULA ALCON SOFT TIP 23G

## (undated) DEVICE — PACK CONSTELLATION POST 25G 20K

## (undated) DEVICE — WARMING BLANKET LOWER ADULT

## (undated) DEVICE — CANNULA ALCON SOFT TIP 25G

## (undated) DEVICE — NDL HYPO SAFE 22G X 1.5" (BLACK)

## (undated) DEVICE — SHIELD CORNEAL LIGHT 8MM 20/BX